# Patient Record
Sex: FEMALE | Race: ASIAN | Employment: UNEMPLOYED | URBAN - METROPOLITAN AREA
[De-identification: names, ages, dates, MRNs, and addresses within clinical notes are randomized per-mention and may not be internally consistent; named-entity substitution may affect disease eponyms.]

---

## 2017-09-24 ENCOUNTER — OFFICE VISIT (OUTPATIENT)
Dept: FAMILY MEDICINE CLINIC | Facility: CLINIC | Age: 27
End: 2017-09-24

## 2017-09-24 DIAGNOSIS — R31.9 HEMATURIA, UNSPECIFIED TYPE: Primary | ICD-10-CM

## 2017-09-24 LAB — CONTROL LINE PRESENT WITH A CLEAR BACKGROUND (YES/NO): YES YES/NO

## 2017-09-24 PROCEDURE — 99202 OFFICE O/P NEW SF 15 MIN: CPT | Performed by: NURSE PRACTITIONER

## 2017-09-24 PROCEDURE — 81025 URINE PREGNANCY TEST: CPT | Performed by: NURSE PRACTITIONER

## 2017-09-24 NOTE — PROGRESS NOTES
Pt presents to clinic for bleeding and cramping. Pt states she hasn't had a period d/t birth control. Pt states she has had break through bleeding before, but not with cramping.  Pt is from out of town and her pcp told her to get a pregnancy test. Pt states

## 2017-11-27 PROCEDURE — 87591 N.GONORRHOEAE DNA AMP PROB: CPT | Performed by: INTERNAL MEDICINE

## 2017-11-27 PROCEDURE — 88175 CYTOPATH C/V AUTO FLUID REDO: CPT | Performed by: INTERNAL MEDICINE

## 2017-11-27 PROCEDURE — 87491 CHLMYD TRACH DNA AMP PROBE: CPT | Performed by: INTERNAL MEDICINE

## 2018-09-07 PROBLEM — G43.009 MIGRAINE WITHOUT AURA AND WITHOUT STATUS MIGRAINOSUS, NOT INTRACTABLE: Status: ACTIVE | Noted: 2018-09-07

## 2018-10-21 ENCOUNTER — HOSPITAL ENCOUNTER (OUTPATIENT)
Age: 28
Discharge: HOME | End: 2018-10-21
Attending: FAMILY MEDICINE
Payer: COMMERCIAL

## 2018-10-21 VITALS
BODY MASS INDEX: 17.52 KG/M2 | HEART RATE: 79 BPM | SYSTOLIC BLOOD PRESSURE: 108 MMHG | HEIGHT: 61.5 IN | TEMPERATURE: 99 F | WEIGHT: 94 LBS | OXYGEN SATURATION: 100 % | RESPIRATION RATE: 18 BRPM | DIASTOLIC BLOOD PRESSURE: 70 MMHG

## 2018-10-21 DIAGNOSIS — R53.83 FATIGUE, UNSPECIFIED TYPE: ICD-10-CM

## 2018-10-21 DIAGNOSIS — D50.9 IRON DEFICIENCY ANEMIA, UNSPECIFIED IRON DEFICIENCY ANEMIA TYPE: Primary | ICD-10-CM

## 2018-10-21 PROCEDURE — 80047 BASIC METABLC PNL IONIZED CA: CPT

## 2018-10-21 PROCEDURE — 99214 OFFICE O/P EST MOD 30 MIN: CPT

## 2018-10-21 PROCEDURE — 85025 COMPLETE CBC W/AUTO DIFF WBC: CPT | Performed by: FAMILY MEDICINE

## 2018-10-21 PROCEDURE — 81025 URINE PREGNANCY TEST: CPT | Performed by: FAMILY MEDICINE

## 2018-10-21 PROCEDURE — 96360 HYDRATION IV INFUSION INIT: CPT

## 2018-10-21 PROCEDURE — 99204 OFFICE O/P NEW MOD 45 MIN: CPT

## 2018-10-21 RX ORDER — SODIUM CHLORIDE 9 MG/ML
INJECTION, SOLUTION INTRAVENOUS ONCE
Status: COMPLETED | OUTPATIENT
Start: 2018-10-21 | End: 2018-10-21

## 2018-10-21 NOTE — ED PROVIDER NOTES
Patient Seen in: [facility]    History   Patient presents with:  Poor Feed Anorexia (constitutional)    Stated Complaint: LACK OF APPETITE, WEAKNESS X 1 WEEK    HPI    66-year-old female presents to immediate care with the is oriented to person, place, and time. She appears well-developed and well-nourished. No distress. HENT:   Head: Normocephalic and atraumatic.    Right Ear: External ear normal.   Left Ear: External ear normal.   Nose: Nose normal.   Mouth/Throat: Algernon Brew follow-up with the PCP encouraged      Disposition:  There is no disposition on file for this visit. There is no disposition time on file for this visit.     Follow-up:  Jose Pugh, [address] [place], [place] [address] [phone]

## 2018-10-21 NOTE — ED INITIAL ASSESSMENT (HPI)
Pt states she's unable to eat. Pt states the smell of food and when she tries to eat she feels nauseous. Pt states she feels stressed. Pt states she's had a poor appetite and nausea for 1 week.

## 2018-11-08 PROCEDURE — 36415 COLL VENOUS BLD VENIPUNCTURE: CPT | Performed by: INTERNAL MEDICINE

## 2018-11-08 PROCEDURE — 82784 ASSAY IGA/IGD/IGG/IGM EACH: CPT | Performed by: INTERNAL MEDICINE

## 2018-11-08 PROCEDURE — 83516 IMMUNOASSAY NONANTIBODY: CPT | Performed by: INTERNAL MEDICINE

## 2018-11-11 ENCOUNTER — OFFICE VISIT (OUTPATIENT)
Dept: FAMILY MEDICINE CLINIC | Facility: CLINIC | Age: 28
End: 2018-11-11
Payer: COMMERCIAL

## 2018-11-11 VITALS
DIASTOLIC BLOOD PRESSURE: 64 MMHG | HEIGHT: 61 IN | OXYGEN SATURATION: 100 % | BODY MASS INDEX: 16 KG/M2 | TEMPERATURE: 99 F | HEART RATE: 91 BPM | SYSTOLIC BLOOD PRESSURE: 102 MMHG

## 2018-11-11 DIAGNOSIS — J02.9 SORE THROAT: ICD-10-CM

## 2018-11-11 DIAGNOSIS — Z20.818 EXPOSURE TO STREP THROAT: Primary | ICD-10-CM

## 2018-11-11 PROCEDURE — 87880 STREP A ASSAY W/OPTIC: CPT | Performed by: NURSE PRACTITIONER

## 2018-11-11 PROCEDURE — 99213 OFFICE O/P EST LOW 20 MIN: CPT | Performed by: NURSE PRACTITIONER

## 2018-11-11 RX ORDER — AMOXICILLIN 500 MG/1
500 CAPSULE ORAL 2 TIMES DAILY
Qty: 20 CAPSULE | Refills: 0 | Status: SHIPPED | OUTPATIENT
Start: 2018-11-11 | End: 2018-11-21

## 2018-11-11 NOTE — PROGRESS NOTES
CHIEF COMPLAINT:   Patient presents with:  Sore Throat: pain in left ear x 1 dy       HPI:   Pamela Frias is a 32year old female presents to clinic with complaint of sore throat. Patient has had for 1 days.  Patient reports following associated symptoms: afebrile  SKIN: no rashes, no suspicious lesions  HEAD: atraumatic, normocephalic  EYES: conjunctiva clear, EOM intact  EARS: mild erythema of left TM, no bulging, retraction, or fluid bilaterally  NOSE: nostrils patent, no exudates, nasal mucosa pink and

## 2018-11-28 PROBLEM — D50.8 IRON DEFICIENCY ANEMIA SECONDARY TO INADEQUATE DIETARY IRON INTAKE: Status: ACTIVE | Noted: 2018-11-28

## 2018-12-01 ENCOUNTER — OFFICE VISIT (OUTPATIENT)
Dept: FAMILY MEDICINE CLINIC | Facility: CLINIC | Age: 28
End: 2018-12-01
Payer: COMMERCIAL

## 2018-12-01 VITALS
BODY MASS INDEX: 16.56 KG/M2 | WEIGHT: 90 LBS | RESPIRATION RATE: 18 BRPM | HEART RATE: 97 BPM | TEMPERATURE: 99 F | OXYGEN SATURATION: 100 % | HEIGHT: 62 IN | SYSTOLIC BLOOD PRESSURE: 98 MMHG | DIASTOLIC BLOOD PRESSURE: 62 MMHG

## 2018-12-01 DIAGNOSIS — R39.9 UTI SYMPTOMS: Primary | ICD-10-CM

## 2018-12-01 PROCEDURE — 81003 URINALYSIS AUTO W/O SCOPE: CPT | Performed by: PHYSICIAN ASSISTANT

## 2018-12-01 PROCEDURE — 81025 URINE PREGNANCY TEST: CPT | Performed by: PHYSICIAN ASSISTANT

## 2018-12-01 PROCEDURE — 99213 OFFICE O/P EST LOW 20 MIN: CPT | Performed by: PHYSICIAN ASSISTANT

## 2018-12-01 PROCEDURE — 87086 URINE CULTURE/COLONY COUNT: CPT | Performed by: PHYSICIAN ASSISTANT

## 2018-12-01 RX ORDER — NITROFURANTOIN 25; 75 MG/1; MG/1
100 CAPSULE ORAL 2 TIMES DAILY
Qty: 14 CAPSULE | Refills: 0 | Status: SHIPPED | OUTPATIENT
Start: 2018-12-01 | End: 2018-12-08

## 2018-12-01 NOTE — PATIENT INSTRUCTIONS
-Push fluids- gatorade, water, cranberry juice.  -Will call in 1-3 days with urine culture results  -If have increased urinary urgency, urinary frequency, blood in urine, fevers, chills, sweats, back pain, or abdominal pain, please seek medical care immedi urine  · Abdominal discomfort. This is usually in the lower abdomen above the pubic bone.   · Cloudy urine  · Strong- or bad-smelling urine  · Unable to urinate (urinary retention)  · Unable to hold urine in (urinary incontinence)  · Fever  · Loss of appeti clothing. Care and prevention  These self-care steps can help prevent future infections:  · Drink plenty of fluids to prevent dehydration and flush out your bladder.  Do this unless you must restrict fluids for other health reasons, or your doctor told you (labia)  Date Last Reviewed: 10/1/2016  © 0457-4285 [employer]. [address] [facility], [address] [place]. All rights reserved. This information is not intended as a substitute for professional medical care.  Always follow your healthcare pro

## 2018-12-01 NOTE — PROGRESS NOTES
CHIEF COMPLAINT:   Patient presents with:  UTI: burning while urinating , urgency , blood clot x 2 days       HPI:   Fede Jamil is a 32year old female who presents with symptoms of UTI.  Complaining of urinary frequency, urgency, dysuria for last 2-3 d BP 98/62 (BP Location: Right arm, Patient Position: Sitting, Cuff Size: adult)   Pulse 97   Temp 98.5 °F (36.9 °C) (Oral)   Resp 18   Ht 62\"   Wt 90 lb   LMP 11/21/2018 (Approximate)   SpO2 100%   BMI 16.46 kg/m²   GENERAL: well developed, well nourished, Do not wear a wet bathing suit for a long time. Bladder Infection, Female (Adult)    Urine is normally doesn't have any bacteria in it. But bacteria can get into the urinary tract from the skin around the rectum.  Or they can travel in the blood from [facility] · Wiping improperly after urinating. Always wipe from front to back. · Bowel incontinence  · Pregnancy  · Procedures such as having a catheter inserted  · Older age  · Not emptying your bladder. This can allow bacteria a chance to grow in your urine.   · D · Avoid sex until your symptoms are gone. · Avoid caffeine, alcohol, and spicy foods. These can irritate your bladder. · Urinate right after intercourse to flush out your bladder.   · If you use birth control pills and have frequent bladder infections, di

## 2018-12-10 PROCEDURE — 88305 TISSUE EXAM BY PATHOLOGIST: CPT | Performed by: INTERNAL MEDICINE

## 2018-12-10 PROCEDURE — 88342 IMHCHEM/IMCYTCHM 1ST ANTB: CPT | Performed by: INTERNAL MEDICINE

## 2019-01-16 ENCOUNTER — OFFICE VISIT (OUTPATIENT)
Dept: FAMILY MEDICINE CLINIC | Facility: CLINIC | Age: 29
End: 2019-01-16
Payer: COMMERCIAL

## 2019-01-16 VITALS
BODY MASS INDEX: 16 KG/M2 | TEMPERATURE: 100 F | WEIGHT: 88.81 LBS | RESPIRATION RATE: 16 BRPM | DIASTOLIC BLOOD PRESSURE: 56 MMHG | HEART RATE: 107 BPM | SYSTOLIC BLOOD PRESSURE: 80 MMHG | OXYGEN SATURATION: 99 %

## 2019-01-16 DIAGNOSIS — J06.9 VIRAL UPPER RESPIRATORY TRACT INFECTION: Primary | ICD-10-CM

## 2019-01-16 PROCEDURE — 99213 OFFICE O/P EST LOW 20 MIN: CPT | Performed by: FAMILY MEDICINE

## 2019-01-17 NOTE — PATIENT INSTRUCTIONS
Most viral illnesses get worse for the first 3-5 days, then plateau and improve gradually over the next 3-5 days. Monitor symptoms for now. Use otc meds for comfort as needed:  Continue dayquil/nyquil if needed.    Saline nasal sprays  Consider applying

## 2019-01-17 NOTE — PROGRESS NOTES
CHIEF COMPLAINT:   Patient presents with:  Cough: RT ear pain, cough, Rt face pressure, bodyache, x 2 days      HPI:   Hany Cuello is a 29year old female who presents for upper respiratory symptoms for  2 days.  Patient reports sore throat, congestion, dr (Oral)   Resp 16   Wt 88 lb 12.8 oz   LMP 01/15/2019   SpO2 99%   Breastfeeding? No   BMI 16.24 kg/m²   GENERAL: well developed, well nourished,in no apparent distress  SKIN: no rashes,no suspicious lesions  HEAD: atraumatic, normocephalic.   Sinuses: Non-t

## 2019-04-02 ENCOUNTER — NURSE ONLY (OUTPATIENT)
Dept: FAMILY MEDICINE CLINIC | Facility: CLINIC | Age: 29
End: 2019-04-02
Payer: COMMERCIAL

## 2019-04-02 VITALS
OXYGEN SATURATION: 100 % | DIASTOLIC BLOOD PRESSURE: 50 MMHG | SYSTOLIC BLOOD PRESSURE: 90 MMHG | TEMPERATURE: 98 F | WEIGHT: 90 LBS | HEART RATE: 85 BPM | RESPIRATION RATE: 16 BRPM | BODY MASS INDEX: 16 KG/M2

## 2019-04-02 DIAGNOSIS — J02.9 PHARYNGITIS, UNSPECIFIED ETIOLOGY: ICD-10-CM

## 2019-04-02 DIAGNOSIS — J02.9 SORE THROAT: Primary | ICD-10-CM

## 2019-04-02 PROCEDURE — 99213 OFFICE O/P EST LOW 20 MIN: CPT | Performed by: NURSE PRACTITIONER

## 2019-04-02 PROCEDURE — 87880 STREP A ASSAY W/OPTIC: CPT | Performed by: NURSE PRACTITIONER

## 2019-04-02 NOTE — PROGRESS NOTES
CHIEF COMPLAINT:   Patient presents with:  Sore Throat: sore throat, chest pressure, bodyaches x today       HPI:   Bhavna Yanez is a 29year old female presents to clinic with symptoms of sore throat. Patient has had for <12 hours.  Symptoms have wrosene EARS: TM's clear, non-injected, no bulging, retraction, or fluid  NOSE: nostrils patent, no exudates, nasal mucosa pink and noninflamed  THROAT: oral mucosa pink, moist. Posterior pharynx erythematous and injected. + exudates. Tonsils 1/4.    NECK: supple, · You or your child should drink plenty of fluids to prevent dehydration. · Use throat lozenges or numbing throat sprays to help reduce pain. Gargling with warm salt water will also help reduce throat pain.  Dissolve 1/2 teaspoon of salt in 1 glass of warm · Can’t swallow liquids, a lot of drooling, or can’t open mouth wide due to throat pain  · Signs of dehydration, such as very dark urine or no urine, sunken eyes, dizziness  · Trouble breathing or noisy breathing  · Muffled voice  · New rash  · Other sympt

## 2019-04-02 NOTE — PATIENT INSTRUCTIONS
-stay well hydrated: warm tea with lemon and honey may be helpful.  Some people prefer cold things such as popsicles  -rest  -humidifier overnight  -tylenol or ibuprofen as needed  -follow up if new or worsening symptoms in next 4-5 days      Viral Pharyngi ulcer or GI bleeding, talk with your healthcare provider before using these medicines. Follow-up care  Follow up with a healthcare provider or our staff if you or your child are not getting better over the next week.   When to seek medical advice  Call you

## 2019-04-16 ENCOUNTER — OFFICE VISIT (OUTPATIENT)
Dept: FAMILY MEDICINE CLINIC | Facility: CLINIC | Age: 29
End: 2019-04-16
Payer: COMMERCIAL

## 2019-04-16 VITALS
RESPIRATION RATE: 16 BRPM | WEIGHT: 94.19 LBS | BODY MASS INDEX: 17 KG/M2 | HEART RATE: 108 BPM | DIASTOLIC BLOOD PRESSURE: 66 MMHG | SYSTOLIC BLOOD PRESSURE: 98 MMHG | OXYGEN SATURATION: 100 % | TEMPERATURE: 99 F

## 2019-04-16 DIAGNOSIS — H66.91 ACUTE RIGHT OTITIS MEDIA: Primary | ICD-10-CM

## 2019-04-16 DIAGNOSIS — B37.9 ANTIBIOTIC-INDUCED YEAST INFECTION: ICD-10-CM

## 2019-04-16 DIAGNOSIS — H61.21 CERUMEN DEBRIS ON TYMPANIC MEMBRANE OF RIGHT EAR: ICD-10-CM

## 2019-04-16 DIAGNOSIS — T36.95XA ANTIBIOTIC-INDUCED YEAST INFECTION: ICD-10-CM

## 2019-04-16 PROCEDURE — 99213 OFFICE O/P EST LOW 20 MIN: CPT | Performed by: NURSE PRACTITIONER

## 2019-04-16 RX ORDER — FLUTICASONE PROPIONATE 50 MCG
2 SPRAY, SUSPENSION (ML) NASAL DAILY
Qty: 1 BOTTLE | Refills: 0 | Status: SHIPPED | OUTPATIENT
Start: 2019-04-16 | End: 2019-04-30

## 2019-04-16 RX ORDER — AMOXICILLIN 875 MG/1
875 TABLET, COATED ORAL 2 TIMES DAILY
Qty: 20 TABLET | Refills: 0 | Status: SHIPPED | OUTPATIENT
Start: 2019-04-16 | End: 2019-04-26

## 2019-04-16 RX ORDER — FLUCONAZOLE 150 MG/1
150 TABLET ORAL ONCE
Qty: 1 TABLET | Refills: 0 | Status: SHIPPED | OUTPATIENT
Start: 2019-04-16 | End: 2019-08-16

## 2019-04-16 NOTE — PATIENT INSTRUCTIONS
· It is very important to complete full course of antibiotic. · Rest and fluids  · Fluticasone as prescribed   · Take nasal decongestant tomorrow around 12 noon.      · Acetaminophen or ibuprofen according to package instructions as needed for pain  · Scott that the symptoms of ear infections often go away in a couple of days without antibiotics. Bacteria can become resistant to antibiotics, and the medicine may cause side effects.  For these reasons, your healthcare provider may wait 1 to 3 days to see if the comfortable. Ask your provider if you can take aspirin, acetaminophen, or ibuprofen to control your fever. Anyone under the age of 24 with a viral illness should not take aspirin because of the increased risk of Reye's syndrome.    Keep a daily record of

## 2019-04-16 NOTE — PROGRESS NOTES
CHIEF COMPLAINT:   Patient presents with:  Ear Pain: RT ear pain, x yesterday       HPI:   Bari Carrizales is a 29year old female who presents to clinic today with complaints of right ear pain. Has had for 1  days. Pain is described as sharp and full.   Darby Angles visualized. Left TM: clear grey, no bulging, no retraction, + fluid, bony landmarks visualized.   NOSE: nostrils patent, no nasal discharge, nasal mucosa pink and noninflamed  THROAT: oral mucosa pink, moist. Posterior pharynx is not erythematous or inje 0     Si sprays by Each Nare route daily for 14 days. • fluconazole (DIFLUCAN) 150 MG Oral Tab 1 tablet 0     Sig: Take 1 tablet (150 mg total) by mouth once for 1 dose.  Take once symptoms start           Patient Instructions   · It is very important provider looks for movement of the eardrum. If there is fluid behind the eardrum, the membrane will not move well. How is it treated? Antibiotic medicine is a common treatment for ear infections.  However, recent studies have shown that the symptoms of protect the ear from water. You will need one or more follow-up appointments to check the healing of your eardrum. If you have a fever:   Rest until your temperature has fallen below 100°F (37.8°C). Then become as active as is comfortable.    Ask your pro

## 2019-05-08 PROCEDURE — 87338 HPYLORI STOOL AG IA: CPT | Performed by: INTERNAL MEDICINE

## 2019-07-25 ENCOUNTER — OFFICE VISIT (OUTPATIENT)
Dept: FAMILY MEDICINE CLINIC | Facility: CLINIC | Age: 29
End: 2019-07-25
Payer: COMMERCIAL

## 2019-07-25 VITALS
DIASTOLIC BLOOD PRESSURE: 54 MMHG | HEIGHT: 62 IN | BODY MASS INDEX: 16.2 KG/M2 | RESPIRATION RATE: 16 BRPM | SYSTOLIC BLOOD PRESSURE: 98 MMHG | HEART RATE: 87 BPM | TEMPERATURE: 99 F | WEIGHT: 88 LBS | OXYGEN SATURATION: 99 %

## 2019-07-25 DIAGNOSIS — M77.9 TENDONITIS: Primary | ICD-10-CM

## 2019-07-25 PROCEDURE — 99213 OFFICE O/P EST LOW 20 MIN: CPT | Performed by: NURSE PRACTITIONER

## 2019-07-25 RX ORDER — NAPROXEN 500 MG/1
500 TABLET ORAL 2 TIMES DAILY WITH MEALS
Qty: 20 TABLET | Refills: 0 | Status: SHIPPED | OUTPATIENT
Start: 2019-07-25 | End: 2019-08-04

## 2019-07-25 NOTE — PROGRESS NOTES
CHIEF COMPLAINT:     Patient presents with:  Pain: left arm pain x2 days      HPI:   Amanda Alcantar is a 29year old female who presents today with a chief complaint of pain to left upper arm, forearm pain. Cause - Unknown.   She thinks that she may have \" swelling, or redness. No pain over medial epicondyl, No pain over lateral epicondyl. No effusion noted, and no warmth to touch or erythema overlying joints. ROM: Good with supination and pronation. Full extension and flexion.       Strength: Normal hand it  [id number]   What causes lateral epicondylitis? The condition most often occurs because of overuse.  This can be from any activity that repeatedly puts stress on the forearm extensor muscles or tendons and wrist. For instance, chon symptoms. In many cases, the surgeon removes the damaged tissue. Possible complications of lateral epicondylitis  If the tendons involved don’t heal properly, symptoms may return or get worse. To help prevent this, follow your treatment plan as directed.

## 2019-07-25 NOTE — PATIENT INSTRUCTIONS
Rest and don't over use  May use ice to area 2-3 times a day  Naproxen as prescribed for 3 days, if improving may take as needed. 10 full days given. May take 1/2 of tablet.        Follow-up with PCP if not improving            Understanding Lateral Epico different equipment. This helps prevent further damage to the tendons. · Using cold packs. Putting an ice pack on the injured area can help reduce pain and swelling. · Taking pain medicines.  Taking prescription or over-the-counter pain medicines may help

## 2020-07-23 PROBLEM — Z00.00 ROUTINE GENERAL MEDICAL EXAMINATION AT A HEALTH CARE FACILITY: Status: ACTIVE | Noted: 2020-07-23

## 2021-03-09 PROBLEM — D56.3 THALASSEMIA MINOR: Status: ACTIVE | Noted: 2021-03-09

## 2021-04-22 PROBLEM — R53.83 FATIGUE, UNSPECIFIED TYPE: Status: ACTIVE | Noted: 2021-04-22

## 2021-04-22 PROBLEM — R05.9 COUGH: Status: ACTIVE | Noted: 2021-04-22

## 2021-04-22 PROBLEM — R19.7 DIARRHEA, UNSPECIFIED TYPE: Status: ACTIVE | Noted: 2021-04-22
